# Patient Record
Sex: MALE | NOT HISPANIC OR LATINO | URBAN - METROPOLITAN AREA
[De-identification: names, ages, dates, MRNs, and addresses within clinical notes are randomized per-mention and may not be internally consistent; named-entity substitution may affect disease eponyms.]

---

## 2017-01-01 ENCOUNTER — INPATIENT (INPATIENT)
Facility: HOSPITAL | Age: 47
LOS: 16 days | Discharge: HOME | End: 2017-01-18
Attending: PSYCHIATRY & NEUROLOGY | Admitting: PSYCHIATRY & NEUROLOGY
Payer: MEDICAID

## 2017-01-01 VITALS
OXYGEN SATURATION: 100 % | TEMPERATURE: 98 F | RESPIRATION RATE: 20 BRPM | DIASTOLIC BLOOD PRESSURE: 97 MMHG | SYSTOLIC BLOOD PRESSURE: 142 MMHG | HEART RATE: 107 BPM

## 2017-01-01 DIAGNOSIS — F29 UNSPECIFIED PSYCHOSIS NOT DUE TO A SUBSTANCE OR KNOWN PHYSIOLOGICAL CONDITION: ICD-10-CM

## 2017-01-01 DIAGNOSIS — R69 ILLNESS, UNSPECIFIED: ICD-10-CM

## 2017-01-01 LAB
ALBUMIN SERPL ELPH-MCNC: 4.2 G/DL — SIGNIFICANT CHANGE UP (ref 3.3–5)
ALP SERPL-CCNC: 83 U/L — SIGNIFICANT CHANGE UP (ref 40–120)
ALT FLD-CCNC: 30 U/L — SIGNIFICANT CHANGE UP (ref 4–41)
AMPHET UR-MCNC: POSITIVE — SIGNIFICANT CHANGE UP
APAP SERPL-MCNC: < 15 UG/ML — LOW (ref 15–25)
APPEARANCE UR: CLEAR — SIGNIFICANT CHANGE UP
AST SERPL-CCNC: 42 U/L — HIGH (ref 4–40)
BARBITURATES MEASUREMENT: NEGATIVE — SIGNIFICANT CHANGE UP
BARBITURATES UR SCN-MCNC: NEGATIVE — SIGNIFICANT CHANGE UP
BENZODIAZ SERPL-MCNC: NEGATIVE — SIGNIFICANT CHANGE UP
BENZODIAZ UR-MCNC: NEGATIVE — SIGNIFICANT CHANGE UP
BILIRUB SERPL-MCNC: 0.4 MG/DL — SIGNIFICANT CHANGE UP (ref 0.2–1.2)
BILIRUB UR-MCNC: NEGATIVE — SIGNIFICANT CHANGE UP
BLOOD UR QL VISUAL: NEGATIVE — SIGNIFICANT CHANGE UP
BUN SERPL-MCNC: 31 MG/DL — HIGH (ref 7–23)
CALCIUM SERPL-MCNC: 9 MG/DL — SIGNIFICANT CHANGE UP (ref 8.4–10.5)
CANNABINOIDS UR-MCNC: NEGATIVE — SIGNIFICANT CHANGE UP
CHLORIDE SERPL-SCNC: 102 MMOL/L — SIGNIFICANT CHANGE UP (ref 98–107)
CK SERPL-CCNC: 600 U/L — HIGH (ref 30–200)
CK SERPL-CCNC: 677 U/L — HIGH (ref 30–200)
CO2 SERPL-SCNC: 13 MMOL/L — LOW (ref 22–31)
COCAINE METAB.OTHER UR-MCNC: POSITIVE — SIGNIFICANT CHANGE UP
COLOR SPEC: YELLOW — SIGNIFICANT CHANGE UP
CREAT SERPL-MCNC: 1.16 MG/DL — SIGNIFICANT CHANGE UP (ref 0.5–1.3)
ETHANOL BLD-MCNC: < 10 MG/DL — SIGNIFICANT CHANGE UP
GLUCOSE SERPL-MCNC: 83 MG/DL — SIGNIFICANT CHANGE UP (ref 70–99)
GLUCOSE UR-MCNC: NEGATIVE — SIGNIFICANT CHANGE UP
HCT VFR BLD CALC: 40.9 % — SIGNIFICANT CHANGE UP (ref 39–50)
HGB BLD-MCNC: 13.5 G/DL — SIGNIFICANT CHANGE UP (ref 13–17)
KETONES UR-MCNC: SIGNIFICANT CHANGE UP
LEUKOCYTE ESTERASE UR-ACNC: NEGATIVE — SIGNIFICANT CHANGE UP
MCHC RBC-ENTMCNC: 31.3 PG — SIGNIFICANT CHANGE UP (ref 27–34)
MCHC RBC-ENTMCNC: 33 % — SIGNIFICANT CHANGE UP (ref 32–36)
MCV RBC AUTO: 94.7 FL — SIGNIFICANT CHANGE UP (ref 80–100)
METHADONE UR-MCNC: NEGATIVE — SIGNIFICANT CHANGE UP
MUCOUS THREADS # UR AUTO: SIGNIFICANT CHANGE UP
NITRITE UR-MCNC: NEGATIVE — SIGNIFICANT CHANGE UP
NON-SQ EPI CELLS # UR AUTO: <1 — SIGNIFICANT CHANGE UP
OPIATES UR-MCNC: NEGATIVE — SIGNIFICANT CHANGE UP
OXYCODONE UR-MCNC: NEGATIVE — SIGNIFICANT CHANGE UP
PCP UR-MCNC: NEGATIVE — SIGNIFICANT CHANGE UP
PH UR: 6 — SIGNIFICANT CHANGE UP (ref 4.6–8)
PLATELET # BLD AUTO: 248 K/UL — SIGNIFICANT CHANGE UP (ref 150–400)
PMV BLD: 12.4 FL — SIGNIFICANT CHANGE UP (ref 7–13)
POTASSIUM SERPL-MCNC: 3.8 MMOL/L — SIGNIFICANT CHANGE UP (ref 3.5–5.3)
POTASSIUM SERPL-SCNC: 3.8 MMOL/L — SIGNIFICANT CHANGE UP (ref 3.5–5.3)
PROT SERPL-MCNC: 7.2 G/DL — SIGNIFICANT CHANGE UP (ref 6–8.3)
PROT UR-MCNC: 30 — SIGNIFICANT CHANGE UP
RBC # BLD: 4.32 M/UL — SIGNIFICANT CHANGE UP (ref 4.2–5.8)
RBC # FLD: 13 % — SIGNIFICANT CHANGE UP (ref 10.3–14.5)
RBC CASTS # UR COMP ASSIST: SIGNIFICANT CHANGE UP (ref 0–?)
SALICYLATES SERPL-MCNC: 16.8 MG/DL — SIGNIFICANT CHANGE UP (ref 15–30)
SODIUM SERPL-SCNC: 140 MMOL/L — SIGNIFICANT CHANGE UP (ref 135–145)
SP GR SPEC: > 1.04 — HIGH (ref 1–1.03)
TSH SERPL-MCNC: 0.99 UIU/ML — SIGNIFICANT CHANGE UP (ref 0.27–4.2)
UROBILINOGEN FLD QL: NORMAL E.U. — SIGNIFICANT CHANGE UP (ref 0.1–0.2)
WBC # BLD: 15.75 K/UL — HIGH (ref 3.8–10.5)
WBC # FLD AUTO: 15.75 K/UL — HIGH (ref 3.8–10.5)
WBC UR QL: SIGNIFICANT CHANGE UP (ref 0–?)

## 2017-01-01 PROCEDURE — 99285 EMERGENCY DEPT VISIT HI MDM: CPT | Mod: GC

## 2017-01-01 RX ORDER — OLANZAPINE 15 MG/1
5 TABLET, FILM COATED ORAL AT BEDTIME
Qty: 0 | Refills: 0 | Status: DISCONTINUED | OUTPATIENT
Start: 2017-01-01 | End: 2017-01-04

## 2017-01-01 RX ORDER — HALOPERIDOL DECANOATE 100 MG/ML
5 INJECTION INTRAMUSCULAR EVERY 6 HOURS
Qty: 0 | Refills: 0 | Status: DISCONTINUED | OUTPATIENT
Start: 2017-01-01 | End: 2017-01-18

## 2017-01-01 RX ORDER — NICOTINE POLACRILEX 2 MG
1 GUM BUCCAL DAILY
Qty: 0 | Refills: 0 | Status: DISCONTINUED | OUTPATIENT
Start: 2017-01-01 | End: 2017-01-18

## 2017-01-01 RX ORDER — NICOTINE POLACRILEX 2 MG
2 GUM BUCCAL
Qty: 0 | Refills: 0 | Status: DISCONTINUED | OUTPATIENT
Start: 2017-01-01 | End: 2017-01-18

## 2017-01-01 RX ORDER — OLANZAPINE 15 MG/1
5 TABLET, FILM COATED ORAL AT BEDTIME
Qty: 0 | Refills: 0 | Status: DISCONTINUED | OUTPATIENT
Start: 2017-01-01 | End: 2017-01-01

## 2017-01-01 RX ORDER — DIPHENHYDRAMINE HCL 50 MG
50 CAPSULE ORAL EVERY 6 HOURS
Qty: 0 | Refills: 0 | Status: DISCONTINUED | OUTPATIENT
Start: 2017-01-01 | End: 2017-01-18

## 2017-01-01 RX ORDER — SODIUM CHLORIDE 9 MG/ML
2000 INJECTION INTRAMUSCULAR; INTRAVENOUS; SUBCUTANEOUS ONCE
Qty: 0 | Refills: 0 | Status: COMPLETED | OUTPATIENT
Start: 2017-01-01 | End: 2017-01-01

## 2017-01-01 RX ORDER — ACETAMINOPHEN 500 MG
650 TABLET ORAL ONCE
Qty: 0 | Refills: 0 | Status: COMPLETED | OUTPATIENT
Start: 2017-01-01 | End: 2017-01-01

## 2017-01-01 RX ADMIN — Medication 650 MILLIGRAM(S): at 09:35

## 2017-01-01 RX ADMIN — Medication 1 MILLIGRAM(S): at 19:30

## 2017-01-01 RX ADMIN — SODIUM CHLORIDE 1000 MILLILITER(S): 9 INJECTION INTRAMUSCULAR; INTRAVENOUS; SUBCUTANEOUS at 17:43

## 2017-01-01 RX ADMIN — Medication 2 MILLIGRAM(S): at 13:10

## 2017-01-01 NOTE — ED PROVIDER NOTE - OBJECTIVE STATEMENT
46M history of ADHD on Adderall and Depression presents with tongue ulcerations and agitation.  Patient states tongue ulcers have been present for one year.   Patient remains agitated and is unable to provide a complete history, Multiple attempts to reach family for collateral information at [phone], [phone], or [phone] have been unsuccessful. 46M history of ADHD on Adderall, Polysubstance Abuse, Asperger's and Depression presents with tongue ulcerations and agitation.  Patient states tongue ulcers have been present for one year.   Patient remains agitated and is unable to provide a complete history, Multiple attempts to reach family for collateral information at [phone], [phone], or [phone] have been unsuccessful.

## 2017-01-01 NOTE — ED BEHAVIORAL HEALTH ASSESSMENT NOTE - CURRENT MEDICATION
Wellbutrin  mg qAm, Adderall 30 mg bid, Adderall XP 10 mg in the afternoon, Lexapro 30 mg daily, Dextroamphetamine 10 mg qhs

## 2017-01-01 NOTE — ED BEHAVIORAL HEALTH ASSESSMENT NOTE - DESCRIPTION (FIRST USE, LAST USE, QUANTITY, FREQUENCY, DURATION)
cigarettes > 1 pack a day since age 17 since early teens Patient denies use for the past 18 months, Utox positive for cocaine

## 2017-01-01 NOTE — ED PROVIDER NOTE - CHIEF COMPLAINT
The patient is a 46y Male complaining of tongue pain The patient is a 46y Male complaining of tongue pain, agitation.

## 2017-01-01 NOTE — ED BEHAVIORAL HEALTH ASSESSMENT NOTE - DETAILS
Patient destroyed her mothers belongings and their furniiture cousin committed suicide painful lesions on the tongue cough excoriations Dr. Guzman JEWELL Pt's family aware of the plan and agrees Patient destroyed her mothers belongings and their furniture, several people have orders of protection against him uncle committed suicide

## 2017-01-01 NOTE — ED BEHAVIORAL HEALTH ASSESSMENT NOTE - OTHER
sister, chart neighbors restless, disorganized dyskinesia interviewed in bed whispering grandiosity denies but his behavior is suggestive of hallucinations

## 2017-01-01 NOTE — ED BEHAVIORAL HEALTH ASSESSMENT NOTE - CASE SUMMARY
46M with cocaine dependence depression and anxiety, followed in [facility] addiction clinic, presents with EMS activated by a neighbor when patient was wandering the hallways naked and acting strangely. Patient seen at [facility] 4 days ago, continued on his high dose of adderall, and it was noted he was demanding medication changes. It was documented his tox only showed stimulant. Today tox + cocaine and stimulant. Family states he is threatening them and destroyed property. Presentation thought to be substance induced, but after ativan, hydration, and several hours of sleep in the ED, patient continues to be bizarre, exposing self, responding to internal stimuli with thought disorder. He cannot care for his basic needs at this time and is a risk of harm to both himself and others. Admit to low 4, 939. no CO needed in locked supervised setting as pt is not suicidal or violent. I agree with plan as above. EMS transport. Hold adderall.

## 2017-01-01 NOTE — ED BEHAVIORAL HEALTH ASSESSMENT NOTE - AXIS IV
Problems with primary support/Occupational problems/Problems with interaction with legal system/Problem related to social environment/Economic problems

## 2017-01-01 NOTE — ED BEHAVIORAL HEALTH ASSESSMENT NOTE - HPI (INCLUDE ILLNESS QUALITY, SEVERITY, DURATION, TIMING, CONTEXT, MODIFYING FACTORS, ASSOCIATED SIGNS AND SYMPTOMS)
47 y/o  male, single, domiciled with mother, unemployed, non caregiver, hx of alcohol, cocaine and marijuana abuse, hx of arrests for drug trafficking, resulting in court mandated treatment, recent hx of order of protection requested by his father and ex-girlfriend, one prior inpatient admission at age 13 for marijuana and "emotional disturbances", currently in outpatient treatment at [facility] for cocaine addiction, no hx of suicidality, hx of verbal aggression but not physical brought in by EMS activated by the Pt's neighbors when the Patient was noted pacing the hallways of the apartment building naked.     On arrival the Patient was restless and agitated. A single dose of  Ativan 2 mg PO was given and the Patient fell asleep.      Collateral information gathered from the Pt's sister Raya and mother Queenie clarifies that the Patient was at his usual state of mental health until 11/28/2016, when after his  birthday his behavior started to change. The Patient lost his job and broke up with his girlfriend, who filed a request for order of protection against him and his conflicts with his father got to the point that his father refused to have any contact with the Patient  and also filed a request for order of protection. The Patient was upset about these developments and continues to send "nasty, threatening emails" to his family members. The Patient never became physical in the past. Per the family members the Pt's behavior had dramatically changed during the past 48h. He became restless, agitated, had bizarre behaviors, appeared to be hallucinating, refused to wear clothing, turned  "all the furniture upside-down" and threw his and his mothers belongings on the floor making it impossible for his mother to return to the apartment. The Patient also tore the mother's clothes up and destroyed some of her other belongings. The family members confirmed that this is a marked change com[ared to the Pt's baseline and they never noted to have symptoms to this extent. 45 y/o  male, single, domiciled with mother, unemployed, non caregiver, hx of alcohol, cocaine and marijuana abuse, hx of arrests for drug trafficking, resulting in court mandated treatment, recent hx of order of protection requested by his father and ex-girlfriend, one prior inpatient admission at age 13 for marijuana and "emotional disturbances", currently in outpatient treatment at [facility] for cocaine addiction, no hx of suicidality, hx of verbal aggression but not physical brought in by EMS activated by the Pt's neighbors when the Patient was noted pacing the hallways of the apartment building naked.     On arrival the Patient was restless and agitated. A single dose of  Ativan 2 mg PO was given and the Patient fell asleep. When the Patient woke up he wasn't able to tolerate a full diagnostic interview. The Patient is in hospital garb, but wears the gown as a cape and his pants are below his genitals. The Patient is restless, unable to stay still, and is whispering when answering questions.  The Patient states that he is surprised that he is in a hospital and he has no recollection of how he ended up here. When the writer tell the Patient that he severely damaged his and his mother's property the Patient insists that it had happened days before he was brought in. When asked about his motivation for braking things he sticks his tongue out to show some lesions on it. When the writer asks for further explanation the Patient tells, that the "ulcers" are painful and his "mother wasn't paying enough attention to try to help" an this was his "way of letting her know that he is in pain".  The Patient denies hearing voices but seems to be responding internal stimuli. The Patient frequently goes on tangents, talking about recovery, writing books and making movies. When confronted with the positive Utox the Patient states that it is from the Orajel he was using for the pain. Regarding his skin lesions the Patient is not able to give an explanation.     Collateral information gathered from the Pt's sister Raya and mother Queenie clarifies that the Patient was at his usual state of mental health until 11/28/2016, when after his  birthday his behavior started to change. The Patient lost his job and broke up with his girlfriend, who filed a request for order of protection against him and his conflicts with his father got to the point that his father refused to have any contact with the Patient  and also filed a request for order of protection, as did the ex-girlfriend's boss and the super of the apartment building. The Patient was upset about these developments and continues to send "nasty, threatening emails" to his family members. The Patient never became physical in the past. Per the family members the Pt's behavior had dramatically changed during the past 48h. He became restless, agitated, had bizarre behaviors, appeared to be hallucinating, refused to wear clothing, turned  "all the furniture upside-down" and threw his and his mothers belongings on the floor making it impossible for his mother to return to the apartment. The Patient also tore the mother's clothes up and destroyed some of her other belongings. The family members confirmed that this is a marked change compared to the Pt's baseline and they never noted to have symptoms to this extent. The Pt's mother reports being afraid of her son for the first time, and is concerned that she will be at risk if he returns home.

## 2017-01-01 NOTE — ED PROVIDER NOTE - MEDICAL DECISION MAKING DETAILS
46M history of polysubstance abuse, depression and adhd presents with agitation and oral ulcers,   Plan r/o acute intoxication utox/serum tox, cmp/cbx 46M history of polysubstance abuse, depression, asperger's and adhd presents with agitation and oral ulcers,   Plan r/o acute intoxication utox/serum tox, cmp/cbx. Psych evaluation

## 2017-01-01 NOTE — ED BEHAVIORAL HEALTH ASSESSMENT NOTE - SUICIDE RISK FACTORS
Family history of suicide/Highly impulsive behavior/Substance abuse/dependence/Agitation/severe anxiety

## 2017-01-01 NOTE — ED ADULT NURSE REASSESSMENT NOTE - NS ED NURSE REASSESS COMMENT FT1
Patient is restless, speech is rapid and pressured, garbled, difficult to understand. Noted to have multiple sores/wounds on tongue, patient states "it was an infection." Denies AVH, SHIIP. Patient is disheveled and unkempt. PO medication given as per MD order. NAD. Safety and comfort measures maintained. Receptive to staff support/reassurance.

## 2017-01-01 NOTE — ED ADULT NURSE NOTE - CHIEF COMPLAINT QUOTE
Pt. brought to [facility] ED via EMS. Pt's neighbors called 911 because he was pacing back and forth in hallway. When EMS got to the scene, Pt. was naked in the hallway. Pt. presents to the ED with whispers, cannot understand patient. Pt. is noted with sores on tongue.  Pointing at things and thrashing in stretcher. Pt. has history of cocaine and cannibis abuse, as well as ADD  and depression.  Pt. will need 1:1 observation. According to EMS, pt.'s apartment is completely a mess.  attending at triage to evaluate. Will be seen in adult ED.

## 2017-01-01 NOTE — ED BEHAVIORAL HEALTH ASSESSMENT NOTE - SUMMARY
45 y/o  male, single, domiciled with mother, unemployed, non caregiver, hx of alcohol, cocaine and marijuana abuse, hx of arrests for drug trafficking, resulting in court mandated treatment, recent hx of order of protection requested by his father and ex-girlfriend, one prior inpatient admission at age 13 for marijuana and "emotional disturbances", [facility], no hx of suicidality, hx of verbal aggression but not physical brought in by EMS activated by the Pt's neighbors when the Patient was noted pacing the hallways of the apartment building naked. On examination the Patient was in distress due to involuntary movements and severe anxiety. The Pt's disorganized behavior and speech, disordered thought process and behavior suggestive of  hallucinations is consistent with an acute psychotic episode of unclear etiology.   The Patient currently is not able to provide for his basic needs and his family has safety concerns for him returning home thus the Patient meets criteria for inpatient level of care. As the Patient is not actively suicidal or disorganized to the point when he is no longer safe in a controlled environment there is no indication for CO 1:1. 45 y/o  male, single, domiciled with mother, unemployed, non caregiver, hx of alcohol, cocaine and marijuana abuse, hx of arrests for drug trafficking, resulting in court mandated treatment, recent hx of order of protection requested by his father and ex-girlfriend, one prior inpatient admission at age 13 for marijuana and "emotional disturbances", currently in outpatient treatment at [facility] for cocaine addiction, no hx of suicidality, hx of verbal aggression but not physical brought in by EMS activated by the Pt's neighbors when the Patient was noted pacing the hallways of the apartment building naked. On examination the Patient was in distress due to involuntary movements and severe anxiety. The Pt's disorganized behavior and speech, disordered thought process and behavior suggestive of  hallucinations is consistent with an acute psychotic episode of unclear etiology.   The Patient currently is not able to provide for his basic needs and his family has safety concerns for him returning home thus the Patient meets criteria for inpatient level of care. As the Patient is not actively suicidal or disorganized to the point when he is no longer safe in a controlled environment there is no indication for CO 1:1. The Patient requires anxiolytic and antipsychotic medication for stabilization. As the Patient is already exhibit signs of potential extrapyramidal signs, possibly due to stimulant use the medication of choice was olanzapine rather then risperidone.

## 2017-01-01 NOTE — ED PROVIDER NOTE - PROGRESS NOTE DETAILS
Discussed patient with father (who patient is estranged with and who has an order of protection against) as well as his sister Raya ([phone]) who is in contact with the patient, since 11/28 the patient has been behaving erratically. Patient has been noted to have hallucinations, has been aggressive towards los: pt has been sleeping after ativan given and has not taken fluids in. low SG on ua. will place IV and adminsiter 2 liters NS. disposition as per psychiatry. both cocaine and amphetamines in urine.

## 2017-01-01 NOTE — ED ADULT NURSE NOTE - OBJECTIVE STATEMENT
Patient received into room 24 AA&Ox3 (agitated with manic-like behavior) after being found in this state at his apartment complex. VSS on RA. Patient denies chest pain, N/V, SOB, fever, chills, illicit drugs use - labs pending. Patient with small abrasions/lacerations to entire body and patient continues to be in constant motion/thrashing in stretcher - will continue to monitor.

## 2017-01-01 NOTE — ED BEHAVIORAL HEALTH ASSESSMENT NOTE - DESCRIPTION
sleeping, skin covered in excoriation marks. no outstanding medical problems single, unemployed, lives with mother sleeping, skin covered in excoriation marks. When awake restless, unable to remain still.

## 2017-01-01 NOTE — ED ADULT TRIAGE NOTE - CHIEF COMPLAINT QUOTE
Pt. brought to [facility] ED via EMS. Pt's neighbors called 911 because he was pacing back and forth in hallway. When EMS got to the scene, Pt. was naked in the hallway. Pt. presents to the ED with whispers, cannot understand patient. Pt. is noted with sores on tongue.  Pointing at things and thrashing in stretcher. Pt. has history of cocaine and cannibis abuse, as well as ADD  and depression.  Pt. will need 1:1 observation. According to EMS, pt.'s apartment is completely a mess.  attending at triage to evaluate. Will be seen in adult ED.

## 2017-01-01 NOTE — ED PROVIDER NOTE - ATTENDING CONTRIBUTION TO CARE
los: limited hx from pt.   unable to reach relatives by phone.  pt states he has hx asbergers and adhd; on adderal. had verbal altercation with mother and sent to ED.  pts only other complaint is painful lesions to tongue (year duration?).   at times becomes agitated, and talks in a rapid and stressed tone.   exam: remarkable for difficulty sitting still. there are numerous ulcerated lesions to tongue  Labs sent. attempting to reach family. psychiatry aware.

## 2017-01-02 LAB
CHOLEST SERPL-MCNC: 120 MG/DL — SIGNIFICANT CHANGE UP (ref 120–199)
CK SERPL-CCNC: 427 U/L — HIGH (ref 30–200)
HDLC SERPL-MCNC: 52 MG/DL — SIGNIFICANT CHANGE UP (ref 35–55)
LIPID PNL WITH DIRECT LDL SERPL: 48 MG/DL — SIGNIFICANT CHANGE UP
SPECIMEN SOURCE: SIGNIFICANT CHANGE UP
TRIGL SERPL-MCNC: 77 MG/DL — SIGNIFICANT CHANGE UP (ref 10–149)

## 2017-01-02 PROCEDURE — 99222 1ST HOSP IP/OBS MODERATE 55: CPT

## 2017-01-02 RX ADMIN — Medication 1 PATCH: at 08:33

## 2017-01-02 RX ADMIN — Medication 1 MILLIGRAM(S): at 08:33

## 2017-01-02 RX ADMIN — OLANZAPINE 5 MILLIGRAM(S): 15 TABLET, FILM COATED ORAL at 21:29

## 2017-01-03 LAB
BACTERIA UR CULT: SIGNIFICANT CHANGE UP
CK SERPL-CCNC: 108 U/L — SIGNIFICANT CHANGE UP (ref 30–200)

## 2017-01-03 PROCEDURE — 99232 SBSQ HOSP IP/OBS MODERATE 35: CPT

## 2017-01-04 PROCEDURE — 99232 SBSQ HOSP IP/OBS MODERATE 35: CPT

## 2017-01-04 PROCEDURE — 99223 1ST HOSP IP/OBS HIGH 75: CPT

## 2017-01-04 RX ORDER — BUPROPION HYDROCHLORIDE 150 MG/1
100 TABLET, EXTENDED RELEASE ORAL DAILY
Qty: 0 | Refills: 0 | Status: COMPLETED | OUTPATIENT
Start: 2017-01-04 | End: 2017-01-04

## 2017-01-04 RX ORDER — ESCITALOPRAM OXALATE 10 MG/1
5 TABLET, FILM COATED ORAL AT BEDTIME
Qty: 0 | Refills: 0 | Status: DISCONTINUED | OUTPATIENT
Start: 2017-01-04 | End: 2017-01-09

## 2017-01-04 RX ADMIN — Medication 30 MILLILITER(S): at 14:30

## 2017-01-04 RX ADMIN — BUPROPION HYDROCHLORIDE 100 MILLIGRAM(S): 150 TABLET, EXTENDED RELEASE ORAL at 14:30

## 2017-01-05 PROCEDURE — 99232 SBSQ HOSP IP/OBS MODERATE 35: CPT

## 2017-01-05 RX ORDER — BUPROPION HYDROCHLORIDE 150 MG/1
100 TABLET, EXTENDED RELEASE ORAL
Qty: 0 | Refills: 0 | Status: DISCONTINUED | OUTPATIENT
Start: 2017-01-05 | End: 2017-01-06

## 2017-01-05 RX ADMIN — Medication 1 PATCH: at 09:56

## 2017-01-05 RX ADMIN — BUPROPION HYDROCHLORIDE 100 MILLIGRAM(S): 150 TABLET, EXTENDED RELEASE ORAL at 20:22

## 2017-01-05 RX ADMIN — ESCITALOPRAM OXALATE 5 MILLIGRAM(S): 10 TABLET, FILM COATED ORAL at 20:22

## 2017-01-05 RX ADMIN — BUPROPION HYDROCHLORIDE 100 MILLIGRAM(S): 150 TABLET, EXTENDED RELEASE ORAL at 11:56

## 2017-01-05 RX ADMIN — Medication 30 MILLILITER(S): at 07:25

## 2017-01-06 LAB
HIV1 AG SER QL: SIGNIFICANT CHANGE UP
HIV1+2 AB SPEC QL: SIGNIFICANT CHANGE UP

## 2017-01-06 PROCEDURE — 99232 SBSQ HOSP IP/OBS MODERATE 35: CPT

## 2017-01-06 RX ORDER — BUPROPION HYDROCHLORIDE 150 MG/1
100 TABLET, EXTENDED RELEASE ORAL AT BEDTIME
Qty: 0 | Refills: 0 | Status: COMPLETED | OUTPATIENT
Start: 2017-01-06 | End: 2017-01-06

## 2017-01-06 RX ORDER — BUPROPION HYDROCHLORIDE 150 MG/1
100 TABLET, EXTENDED RELEASE ORAL
Qty: 0 | Refills: 0 | Status: DISCONTINUED | OUTPATIENT
Start: 2017-01-07 | End: 2017-01-13

## 2017-01-06 RX ADMIN — ESCITALOPRAM OXALATE 5 MILLIGRAM(S): 10 TABLET, FILM COATED ORAL at 20:30

## 2017-01-06 RX ADMIN — Medication 1 PATCH: at 08:33

## 2017-01-06 RX ADMIN — BUPROPION HYDROCHLORIDE 100 MILLIGRAM(S): 150 TABLET, EXTENDED RELEASE ORAL at 20:30

## 2017-01-06 RX ADMIN — BUPROPION HYDROCHLORIDE 100 MILLIGRAM(S): 150 TABLET, EXTENDED RELEASE ORAL at 08:55

## 2017-01-07 PROCEDURE — 99232 SBSQ HOSP IP/OBS MODERATE 35: CPT

## 2017-01-07 RX ADMIN — Medication 1 PATCH: at 08:30

## 2017-01-07 RX ADMIN — ESCITALOPRAM OXALATE 5 MILLIGRAM(S): 10 TABLET, FILM COATED ORAL at 20:46

## 2017-01-07 RX ADMIN — BUPROPION HYDROCHLORIDE 100 MILLIGRAM(S): 150 TABLET, EXTENDED RELEASE ORAL at 17:16

## 2017-01-07 RX ADMIN — BUPROPION HYDROCHLORIDE 100 MILLIGRAM(S): 150 TABLET, EXTENDED RELEASE ORAL at 08:27

## 2017-01-08 PROCEDURE — 99232 SBSQ HOSP IP/OBS MODERATE 35: CPT

## 2017-01-08 RX ADMIN — Medication 1 PATCH: at 08:06

## 2017-01-08 RX ADMIN — ESCITALOPRAM OXALATE 5 MILLIGRAM(S): 10 TABLET, FILM COATED ORAL at 20:27

## 2017-01-08 RX ADMIN — Medication 30 MILLILITER(S): at 00:00

## 2017-01-08 RX ADMIN — BUPROPION HYDROCHLORIDE 100 MILLIGRAM(S): 150 TABLET, EXTENDED RELEASE ORAL at 08:06

## 2017-01-08 RX ADMIN — Medication 30 MILLILITER(S): at 05:31

## 2017-01-08 RX ADMIN — BUPROPION HYDROCHLORIDE 100 MILLIGRAM(S): 150 TABLET, EXTENDED RELEASE ORAL at 16:47

## 2017-01-09 PROCEDURE — 99232 SBSQ HOSP IP/OBS MODERATE 35: CPT

## 2017-01-09 RX ORDER — LITHIUM CARBONATE 300 MG/1
300 TABLET, EXTENDED RELEASE ORAL
Qty: 0 | Refills: 0 | Status: DISCONTINUED | OUTPATIENT
Start: 2017-01-10 | End: 2017-01-11

## 2017-01-09 RX ORDER — LITHIUM CARBONATE 300 MG/1
300 TABLET, EXTENDED RELEASE ORAL ONCE
Qty: 0 | Refills: 0 | Status: COMPLETED | OUTPATIENT
Start: 2017-01-09 | End: 2017-01-09

## 2017-01-09 RX ADMIN — Medication 1 PATCH: at 09:00

## 2017-01-09 RX ADMIN — Medication 1 PATCH: at 09:22

## 2017-01-09 RX ADMIN — LITHIUM CARBONATE 300 MILLIGRAM(S): 300 TABLET, EXTENDED RELEASE ORAL at 17:14

## 2017-01-09 RX ADMIN — BUPROPION HYDROCHLORIDE 100 MILLIGRAM(S): 150 TABLET, EXTENDED RELEASE ORAL at 17:14

## 2017-01-09 RX ADMIN — BUPROPION HYDROCHLORIDE 100 MILLIGRAM(S): 150 TABLET, EXTENDED RELEASE ORAL at 08:39

## 2017-01-09 RX ADMIN — Medication 30 MILLILITER(S): at 21:04

## 2017-01-09 RX ADMIN — Medication 30 MILLILITER(S): at 01:35

## 2017-01-10 PROCEDURE — 99232 SBSQ HOSP IP/OBS MODERATE 35: CPT

## 2017-01-10 RX ORDER — DIPHENHYDRAMINE HCL 50 MG
50 CAPSULE ORAL EVERY 4 HOURS
Qty: 0 | Refills: 0 | Status: DISCONTINUED | OUTPATIENT
Start: 2017-01-10 | End: 2017-01-17

## 2017-01-10 RX ORDER — HALOPERIDOL DECANOATE 100 MG/ML
5 INJECTION INTRAMUSCULAR ONCE
Qty: 0 | Refills: 0 | Status: COMPLETED | OUTPATIENT
Start: 2017-01-10 | End: 2017-01-10

## 2017-01-10 RX ADMIN — Medication 2 MILLIGRAM(S): at 02:39

## 2017-01-10 RX ADMIN — Medication 1 PATCH: at 09:53

## 2017-01-10 RX ADMIN — BUPROPION HYDROCHLORIDE 100 MILLIGRAM(S): 150 TABLET, EXTENDED RELEASE ORAL at 08:35

## 2017-01-10 RX ADMIN — BUPROPION HYDROCHLORIDE 100 MILLIGRAM(S): 150 TABLET, EXTENDED RELEASE ORAL at 16:45

## 2017-01-10 RX ADMIN — LITHIUM CARBONATE 300 MILLIGRAM(S): 300 TABLET, EXTENDED RELEASE ORAL at 08:35

## 2017-01-10 RX ADMIN — Medication 30 MILLILITER(S): at 02:39

## 2017-01-10 RX ADMIN — HALOPERIDOL DECANOATE 5 MILLIGRAM(S): 100 INJECTION INTRAMUSCULAR at 02:39

## 2017-01-10 RX ADMIN — Medication 50 MILLIGRAM(S): at 02:39

## 2017-01-10 RX ADMIN — Medication 50 MILLIGRAM(S): at 17:08

## 2017-01-10 RX ADMIN — LITHIUM CARBONATE 300 MILLIGRAM(S): 300 TABLET, EXTENDED RELEASE ORAL at 20:48

## 2017-01-11 PROCEDURE — 99232 SBSQ HOSP IP/OBS MODERATE 35: CPT

## 2017-01-11 RX ORDER — DIPHENHYDRAMINE HCL 50 MG
50 CAPSULE ORAL EVERY 6 HOURS
Qty: 0 | Refills: 0 | Status: DISCONTINUED | OUTPATIENT
Start: 2017-01-11 | End: 2017-01-17

## 2017-01-11 RX ORDER — LITHIUM CARBONATE 300 MG/1
150 TABLET, EXTENDED RELEASE ORAL ONCE
Qty: 0 | Refills: 0 | Status: COMPLETED | OUTPATIENT
Start: 2017-01-11 | End: 2017-01-11

## 2017-01-11 RX ORDER — HALOPERIDOL DECANOATE 100 MG/ML
5 INJECTION INTRAMUSCULAR ONCE
Qty: 0 | Refills: 0 | Status: COMPLETED | OUTPATIENT
Start: 2017-01-11 | End: 2017-01-11

## 2017-01-11 RX ORDER — HALOPERIDOL DECANOATE 100 MG/ML
5 INJECTION INTRAMUSCULAR EVERY 6 HOURS
Qty: 0 | Refills: 0 | Status: DISCONTINUED | OUTPATIENT
Start: 2017-01-11 | End: 2017-01-12

## 2017-01-11 RX ORDER — TRAZODONE HCL 50 MG
100 TABLET ORAL ONCE
Qty: 0 | Refills: 0 | Status: COMPLETED | OUTPATIENT
Start: 2017-01-11 | End: 2017-01-11

## 2017-01-11 RX ORDER — LITHIUM CARBONATE 300 MG/1
450 TABLET, EXTENDED RELEASE ORAL
Qty: 0 | Refills: 0 | Status: DISCONTINUED | OUTPATIENT
Start: 2017-01-11 | End: 2017-01-13

## 2017-01-11 RX ADMIN — LITHIUM CARBONATE 150 MILLIGRAM(S): 300 TABLET, EXTENDED RELEASE ORAL at 11:26

## 2017-01-11 RX ADMIN — Medication 50 MILLIGRAM(S): at 23:30

## 2017-01-11 RX ADMIN — Medication 1 PATCH: at 12:58

## 2017-01-11 RX ADMIN — HALOPERIDOL DECANOATE 5 MILLIGRAM(S): 100 INJECTION INTRAMUSCULAR at 23:30

## 2017-01-11 RX ADMIN — Medication 100 MILLIGRAM(S): at 23:30

## 2017-01-11 RX ADMIN — Medication 50 MILLIGRAM(S): at 08:10

## 2017-01-11 RX ADMIN — LITHIUM CARBONATE 300 MILLIGRAM(S): 300 TABLET, EXTENDED RELEASE ORAL at 08:10

## 2017-01-11 RX ADMIN — Medication 50 MILLIGRAM(S): at 12:56

## 2017-01-11 RX ADMIN — LITHIUM CARBONATE 450 MILLIGRAM(S): 300 TABLET, EXTENDED RELEASE ORAL at 20:48

## 2017-01-11 RX ADMIN — BUPROPION HYDROCHLORIDE 100 MILLIGRAM(S): 150 TABLET, EXTENDED RELEASE ORAL at 08:10

## 2017-01-11 RX ADMIN — BUPROPION HYDROCHLORIDE 100 MILLIGRAM(S): 150 TABLET, EXTENDED RELEASE ORAL at 16:28

## 2017-01-12 PROCEDURE — 99232 SBSQ HOSP IP/OBS MODERATE 35: CPT

## 2017-01-12 RX ORDER — HALOPERIDOL DECANOATE 100 MG/ML
5 INJECTION INTRAMUSCULAR ONCE
Qty: 0 | Refills: 0 | Status: DISCONTINUED | OUTPATIENT
Start: 2017-01-12 | End: 2017-01-12

## 2017-01-12 RX ORDER — TRAZODONE HCL 50 MG
50 TABLET ORAL AT BEDTIME
Qty: 0 | Refills: 0 | Status: DISCONTINUED | OUTPATIENT
Start: 2017-01-12 | End: 2017-01-18

## 2017-01-12 RX ADMIN — Medication 1 PATCH: at 08:34

## 2017-01-12 RX ADMIN — LITHIUM CARBONATE 450 MILLIGRAM(S): 300 TABLET, EXTENDED RELEASE ORAL at 20:47

## 2017-01-12 RX ADMIN — BUPROPION HYDROCHLORIDE 100 MILLIGRAM(S): 150 TABLET, EXTENDED RELEASE ORAL at 08:34

## 2017-01-12 RX ADMIN — LITHIUM CARBONATE 450 MILLIGRAM(S): 300 TABLET, EXTENDED RELEASE ORAL at 08:34

## 2017-01-13 LAB
ALBUMIN SERPL ELPH-MCNC: 3.6 G/DL — SIGNIFICANT CHANGE UP (ref 3.3–5)
ALP SERPL-CCNC: 81 U/L — SIGNIFICANT CHANGE UP (ref 40–120)
ALT FLD-CCNC: 23 U/L — SIGNIFICANT CHANGE UP (ref 4–41)
AST SERPL-CCNC: 17 U/L — SIGNIFICANT CHANGE UP (ref 4–40)
BASOPHILS # BLD AUTO: 0.05 K/UL — SIGNIFICANT CHANGE UP (ref 0–0.2)
BASOPHILS NFR BLD AUTO: 0.5 % — SIGNIFICANT CHANGE UP (ref 0–2)
BILIRUB SERPL-MCNC: 0.2 MG/DL — SIGNIFICANT CHANGE UP (ref 0.2–1.2)
BUN SERPL-MCNC: 23 MG/DL — SIGNIFICANT CHANGE UP (ref 7–23)
CALCIUM SERPL-MCNC: 9 MG/DL — SIGNIFICANT CHANGE UP (ref 8.4–10.5)
CHLORIDE SERPL-SCNC: 100 MMOL/L — SIGNIFICANT CHANGE UP (ref 98–107)
CO2 SERPL-SCNC: 25 MMOL/L — SIGNIFICANT CHANGE UP (ref 22–31)
CREAT SERPL-MCNC: 0.8 MG/DL — SIGNIFICANT CHANGE UP (ref 0.5–1.3)
EOSINOPHIL # BLD AUTO: 0.23 K/UL — SIGNIFICANT CHANGE UP (ref 0–0.5)
EOSINOPHIL NFR BLD AUTO: 2.1 % — SIGNIFICANT CHANGE UP (ref 0–6)
GLUCOSE SERPL-MCNC: 91 MG/DL — SIGNIFICANT CHANGE UP (ref 70–99)
HCT VFR BLD CALC: 36.9 % — LOW (ref 39–50)
HGB BLD-MCNC: 11.7 G/DL — LOW (ref 13–17)
IMM GRANULOCYTES NFR BLD AUTO: 0.8 % — SIGNIFICANT CHANGE UP (ref 0–1.5)
LITHIUM SERPL-MCNC: 0.42 MMOL/L — LOW (ref 0.6–1.2)
LYMPHOCYTES # BLD AUTO: 1.4 K/UL — SIGNIFICANT CHANGE UP (ref 1–3.3)
LYMPHOCYTES # BLD AUTO: 12.9 % — LOW (ref 13–44)
MCHC RBC-ENTMCNC: 30.6 PG — SIGNIFICANT CHANGE UP (ref 27–34)
MCHC RBC-ENTMCNC: 31.7 % — LOW (ref 32–36)
MCV RBC AUTO: 96.6 FL — SIGNIFICANT CHANGE UP (ref 80–100)
MONOCYTES # BLD AUTO: 0.72 K/UL — SIGNIFICANT CHANGE UP (ref 0–0.9)
MONOCYTES NFR BLD AUTO: 6.6 % — SIGNIFICANT CHANGE UP (ref 2–14)
NEUTROPHILS # BLD AUTO: 8.36 K/UL — HIGH (ref 1.8–7.4)
NEUTROPHILS NFR BLD AUTO: 77.1 % — HIGH (ref 43–77)
PLATELET # BLD AUTO: 358 K/UL — SIGNIFICANT CHANGE UP (ref 150–400)
PMV BLD: 12 FL — SIGNIFICANT CHANGE UP (ref 7–13)
POTASSIUM SERPL-MCNC: 5.3 MMOL/L — SIGNIFICANT CHANGE UP (ref 3.5–5.3)
POTASSIUM SERPL-SCNC: 5.3 MMOL/L — SIGNIFICANT CHANGE UP (ref 3.5–5.3)
PROT SERPL-MCNC: 6.2 G/DL — SIGNIFICANT CHANGE UP (ref 6–8.3)
RBC # BLD: 3.82 M/UL — LOW (ref 4.2–5.8)
RBC # FLD: 13.1 % — SIGNIFICANT CHANGE UP (ref 10.3–14.5)
SODIUM SERPL-SCNC: 137 MMOL/L — SIGNIFICANT CHANGE UP (ref 135–145)
WBC # BLD: 10.85 K/UL — HIGH (ref 3.8–10.5)
WBC # FLD AUTO: 10.85 K/UL — HIGH (ref 3.8–10.5)

## 2017-01-13 PROCEDURE — 99232 SBSQ HOSP IP/OBS MODERATE 35: CPT

## 2017-01-13 RX ORDER — BUPROPION HYDROCHLORIDE 150 MG/1
150 TABLET, EXTENDED RELEASE ORAL DAILY
Qty: 0 | Refills: 0 | Status: DISCONTINUED | OUTPATIENT
Start: 2017-01-13 | End: 2017-01-17

## 2017-01-13 RX ORDER — LITHIUM CARBONATE 300 MG/1
600 TABLET, EXTENDED RELEASE ORAL AT BEDTIME
Qty: 0 | Refills: 0 | Status: DISCONTINUED | OUTPATIENT
Start: 2017-01-13 | End: 2017-01-13

## 2017-01-13 RX ORDER — LITHIUM CARBONATE 300 MG/1
1200 TABLET, EXTENDED RELEASE ORAL AT BEDTIME
Qty: 0 | Refills: 0 | Status: DISCONTINUED | OUTPATIENT
Start: 2017-01-15 | End: 2017-01-18

## 2017-01-13 RX ORDER — LITHIUM CARBONATE 300 MG/1
900 TABLET, EXTENDED RELEASE ORAL AT BEDTIME
Qty: 0 | Refills: 0 | Status: COMPLETED | OUTPATIENT
Start: 2017-01-14 | End: 2017-01-14

## 2017-01-13 RX ORDER — LITHIUM CARBONATE 300 MG/1
600 TABLET, EXTENDED RELEASE ORAL AT BEDTIME
Qty: 0 | Refills: 0 | Status: COMPLETED | OUTPATIENT
Start: 2017-01-13 | End: 2017-01-13

## 2017-01-13 RX ADMIN — BUPROPION HYDROCHLORIDE 100 MILLIGRAM(S): 150 TABLET, EXTENDED RELEASE ORAL at 08:14

## 2017-01-13 RX ADMIN — LITHIUM CARBONATE 600 MILLIGRAM(S): 300 TABLET, EXTENDED RELEASE ORAL at 20:33

## 2017-01-13 RX ADMIN — LITHIUM CARBONATE 450 MILLIGRAM(S): 300 TABLET, EXTENDED RELEASE ORAL at 08:14

## 2017-01-13 RX ADMIN — Medication 1 PATCH: at 08:15

## 2017-01-13 RX ADMIN — BUPROPION HYDROCHLORIDE 100 MILLIGRAM(S): 150 TABLET, EXTENDED RELEASE ORAL at 16:17

## 2017-01-13 RX ADMIN — Medication 1 PATCH: at 08:14

## 2017-01-14 RX ORDER — TRAZODONE HCL 50 MG
50 TABLET ORAL ONCE
Qty: 0 | Refills: 0 | Status: COMPLETED | OUTPATIENT
Start: 2017-01-14 | End: 2017-01-14

## 2017-01-14 RX ADMIN — BUPROPION HYDROCHLORIDE 150 MILLIGRAM(S): 150 TABLET, EXTENDED RELEASE ORAL at 08:15

## 2017-01-14 RX ADMIN — Medication 50 MILLIGRAM(S): at 22:42

## 2017-01-14 RX ADMIN — LITHIUM CARBONATE 900 MILLIGRAM(S): 300 TABLET, EXTENDED RELEASE ORAL at 20:45

## 2017-01-14 RX ADMIN — Medication 50 MILLIGRAM(S): at 15:33

## 2017-01-14 RX ADMIN — Medication 50 MILLIGRAM(S): at 08:16

## 2017-01-14 RX ADMIN — Medication 50 MILLIGRAM(S): at 02:37

## 2017-01-14 RX ADMIN — Medication 1 PATCH: at 08:16

## 2017-01-14 RX ADMIN — Medication 1 PATCH: at 08:00

## 2017-01-14 RX ADMIN — Medication 50 MILLIGRAM(S): at 20:45

## 2017-01-15 RX ORDER — HYDROXYZINE HCL 10 MG
25 TABLET ORAL ONCE
Qty: 0 | Refills: 0 | Status: COMPLETED | OUTPATIENT
Start: 2017-01-15 | End: 2017-01-15

## 2017-01-15 RX ADMIN — Medication 50 MILLIGRAM(S): at 16:20

## 2017-01-15 RX ADMIN — Medication 1 PATCH: at 08:13

## 2017-01-15 RX ADMIN — Medication 25 MILLIGRAM(S): at 14:37

## 2017-01-15 RX ADMIN — LITHIUM CARBONATE 1200 MILLIGRAM(S): 300 TABLET, EXTENDED RELEASE ORAL at 21:23

## 2017-01-15 RX ADMIN — Medication 2 MILLIGRAM(S): at 03:44

## 2017-01-15 RX ADMIN — BUPROPION HYDROCHLORIDE 150 MILLIGRAM(S): 150 TABLET, EXTENDED RELEASE ORAL at 08:13

## 2017-01-15 RX ADMIN — Medication 50 MILLIGRAM(S): at 11:32

## 2017-01-15 RX ADMIN — Medication 50 MILLIGRAM(S): at 00:01

## 2017-01-15 RX ADMIN — Medication 1 PATCH: at 08:10

## 2017-01-15 RX ADMIN — Medication 50 MILLIGRAM(S): at 21:23

## 2017-01-16 RX ADMIN — BUPROPION HYDROCHLORIDE 150 MILLIGRAM(S): 150 TABLET, EXTENDED RELEASE ORAL at 10:36

## 2017-01-16 RX ADMIN — LITHIUM CARBONATE 1200 MILLIGRAM(S): 300 TABLET, EXTENDED RELEASE ORAL at 21:12

## 2017-01-17 PROCEDURE — 99232 SBSQ HOSP IP/OBS MODERATE 35: CPT

## 2017-01-17 RX ORDER — HYDROXYZINE HCL 10 MG
50 TABLET ORAL EVERY 6 HOURS
Qty: 0 | Refills: 0 | Status: DISCONTINUED | OUTPATIENT
Start: 2017-01-17 | End: 2017-01-18

## 2017-01-17 RX ORDER — HYDROXYZINE HCL 10 MG
25 TABLET ORAL ONCE
Qty: 0 | Refills: 0 | Status: COMPLETED | OUTPATIENT
Start: 2017-01-17 | End: 2017-01-17

## 2017-01-17 RX ORDER — HYDROXYZINE HCL 10 MG
25 TABLET ORAL EVERY 6 HOURS
Qty: 0 | Refills: 0 | Status: DISCONTINUED | OUTPATIENT
Start: 2017-01-17 | End: 2017-01-17

## 2017-01-17 RX ORDER — BUPROPION HYDROCHLORIDE 150 MG/1
100 TABLET, EXTENDED RELEASE ORAL DAILY
Qty: 0 | Refills: 0 | Status: DISCONTINUED | OUTPATIENT
Start: 2017-01-17 | End: 2017-01-18

## 2017-01-17 RX ADMIN — Medication 50 MILLIGRAM(S): at 13:30

## 2017-01-17 RX ADMIN — Medication 50 MILLIGRAM(S): at 20:05

## 2017-01-17 RX ADMIN — Medication 25 MILLIGRAM(S): at 13:59

## 2017-01-17 RX ADMIN — LITHIUM CARBONATE 1200 MILLIGRAM(S): 300 TABLET, EXTENDED RELEASE ORAL at 20:05

## 2017-01-17 RX ADMIN — Medication 25 MILLIGRAM(S): at 15:43

## 2017-01-17 RX ADMIN — Medication 1 PATCH: at 09:17

## 2017-01-17 RX ADMIN — BUPROPION HYDROCHLORIDE 150 MILLIGRAM(S): 150 TABLET, EXTENDED RELEASE ORAL at 09:17

## 2017-01-18 VITALS — DIASTOLIC BLOOD PRESSURE: 68 MMHG | TEMPERATURE: 98 F | HEART RATE: 78 BPM | SYSTOLIC BLOOD PRESSURE: 118 MMHG

## 2017-01-18 PROCEDURE — 99238 HOSP IP/OBS DSCHRG MGMT 30/<: CPT

## 2017-01-18 RX ORDER — LITHIUM CARBONATE 300 MG/1
4 TABLET, EXTENDED RELEASE ORAL
Qty: 120 | Refills: 0 | OUTPATIENT
Start: 2017-01-18 | End: 2017-02-17

## 2017-01-18 RX ORDER — HYDROXYZINE HCL 10 MG
1 TABLET ORAL
Qty: 60 | Refills: 0 | OUTPATIENT
Start: 2017-01-18 | End: 2017-02-17

## 2017-01-18 RX ORDER — DEXTROAMPHETAMINE SACCHARATE, AMPHETAMINE ASPARTATE, DEXTROAMPHETAMINE SULFATE AND AMPHETAMINE SULFATE 1.875; 1.875; 1.875; 1.875 MG/1; MG/1; MG/1; MG/1
0 TABLET ORAL
Qty: 0 | Refills: 0 | COMMUNITY

## 2017-01-18 RX ORDER — TRAZODONE HCL 50 MG
1 TABLET ORAL
Qty: 30 | Refills: 0 | OUTPATIENT
Start: 2017-01-18 | End: 2017-02-17

## 2017-01-18 RX ORDER — BUPROPION HYDROCHLORIDE 150 MG/1
0 TABLET, EXTENDED RELEASE ORAL
Qty: 0 | Refills: 0 | COMMUNITY

## 2017-01-18 RX ORDER — BUPROPION HYDROCHLORIDE 150 MG/1
1 TABLET, EXTENDED RELEASE ORAL
Qty: 30 | Refills: 0 | OUTPATIENT
Start: 2017-01-18 | End: 2017-02-17

## 2017-01-18 RX ORDER — FLUOXETINE HCL 10 MG
0 CAPSULE ORAL
Qty: 0 | Refills: 0 | COMMUNITY

## 2017-01-18 RX ORDER — NICOTINE POLACRILEX 2 MG
1 GUM BUCCAL
Qty: 1 | Refills: 0 | OUTPATIENT
Start: 2017-01-18 | End: 2017-02-01

## 2017-01-18 RX ADMIN — Medication 1 PATCH: at 08:05

## 2017-01-18 RX ADMIN — Medication 50 MILLIGRAM(S): at 14:20

## 2017-01-18 RX ADMIN — BUPROPION HYDROCHLORIDE 100 MILLIGRAM(S): 150 TABLET, EXTENDED RELEASE ORAL at 08:05

## 2017-01-18 RX ADMIN — Medication 50 MILLIGRAM(S): at 08:05

## 2017-10-17 ENCOUNTER — APPOINTMENT (OUTPATIENT)
Dept: FAMILY MEDICINE | Facility: CLINIC | Age: 47
End: 2017-10-17
Payer: MEDICAID

## 2017-10-17 VITALS
RESPIRATION RATE: 14 BRPM | HEIGHT: 68.11 IN | OXYGEN SATURATION: 97 % | WEIGHT: 178 LBS | SYSTOLIC BLOOD PRESSURE: 122 MMHG | BODY MASS INDEX: 26.98 KG/M2 | HEART RATE: 99 BPM | TEMPERATURE: 98.3 F | DIASTOLIC BLOOD PRESSURE: 80 MMHG

## 2017-10-17 DIAGNOSIS — Z87.891 PERSONAL HISTORY OF NICOTINE DEPENDENCE: ICD-10-CM

## 2017-10-17 DIAGNOSIS — Z60.2 PROBLEMS RELATED TO LIVING ALONE: ICD-10-CM

## 2017-10-17 DIAGNOSIS — Z00.00 ENCOUNTER FOR GENERAL ADULT MEDICAL EXAMINATION W/OUT ABNORMAL FINDINGS: ICD-10-CM

## 2017-10-17 DIAGNOSIS — F41.8 OTHER SPECIFIED ANXIETY DISORDERS: ICD-10-CM

## 2017-10-17 PROCEDURE — 99202 OFFICE O/P NEW SF 15 MIN: CPT

## 2017-10-17 RX ORDER — DEXTROAMPHETAMINE SACCHARATE, AMPHETAMINE ASPARTATE, DEXTROAMPHETAMINE SULFATE, AND AMPHETAMINE SULFATE 2.5; 2.5; 2.5; 2.5 MG/1; MG/1; MG/1; MG/1
10 TABLET ORAL
Refills: 0 | Status: DISCONTINUED | COMMUNITY
End: 2017-10-17

## 2017-10-17 SDOH — SOCIAL STABILITY - SOCIAL INSECURITY: PROBLEMS RELATED TO LIVING ALONE: Z60.2

## 2017-10-19 RX ORDER — DEXTROAMPHETAMINE SULFATE 10 MG/1
10 TABLET ORAL
Refills: 0 | Status: DISCONTINUED | COMMUNITY
End: 2017-10-19

## 2017-10-27 ENCOUNTER — APPOINTMENT (OUTPATIENT)
Dept: FAMILY MEDICINE | Facility: CLINIC | Age: 47
End: 2017-10-27

## 2017-10-30 ENCOUNTER — APPOINTMENT (OUTPATIENT)
Dept: FAMILY MEDICINE | Facility: CLINIC | Age: 47
End: 2017-10-30

## 2017-11-03 ENCOUNTER — APPOINTMENT (OUTPATIENT)
Dept: FAMILY MEDICINE | Facility: CLINIC | Age: 47
End: 2017-11-03

## 2017-11-03 ENCOUNTER — APPOINTMENT (OUTPATIENT)
Dept: FAMILY MEDICINE | Facility: CLINIC | Age: 47
End: 2017-11-03
Payer: MEDICAID

## 2017-11-03 VITALS — SYSTOLIC BLOOD PRESSURE: 120 MMHG | TEMPERATURE: 98 F | DIASTOLIC BLOOD PRESSURE: 70 MMHG

## 2017-11-03 DIAGNOSIS — F98.8 OTHER SPECIFIED BEHAVIORAL AND EMOTIONAL DISORDERS WITH ONSET USUALLY OCCURRING IN CHILDHOOD AND ADOLESCENCE: ICD-10-CM

## 2017-11-03 DIAGNOSIS — R45.1 RESTLESSNESS AND AGITATION: ICD-10-CM

## 2017-11-03 PROCEDURE — 99215 OFFICE O/P EST HI 40 MIN: CPT

## 2017-11-03 RX ORDER — AZITHROMYCIN 250 MG/1
250 TABLET, FILM COATED ORAL
Qty: 6 | Refills: 0 | Status: DISCONTINUED | COMMUNITY
Start: 2017-09-19

## 2017-11-03 RX ORDER — HYDROCORTISONE 10 MG/G
1 OINTMENT TOPICAL
Qty: 28 | Refills: 0 | Status: DISCONTINUED | COMMUNITY
Start: 2017-08-26

## 2017-11-07 ENCOUNTER — OTHER (OUTPATIENT)
Age: 47
End: 2017-11-07

## 2017-11-08 ENCOUNTER — OTHER (OUTPATIENT)
Age: 47
End: 2017-11-08

## 2017-11-14 ENCOUNTER — OTHER (OUTPATIENT)
Age: 47
End: 2017-11-14

## 2017-11-15 ENCOUNTER — OTHER (OUTPATIENT)
Age: 47
End: 2017-11-15

## 2017-12-06 ENCOUNTER — FORM ENCOUNTER (OUTPATIENT)
Age: 47
End: 2017-12-06

## 2017-12-08 ENCOUNTER — APPOINTMENT (OUTPATIENT)
Dept: FAMILY MEDICINE | Facility: CLINIC | Age: 47
End: 2017-12-08

## 2017-12-17 ENCOUNTER — FORM ENCOUNTER (OUTPATIENT)
Age: 47
End: 2017-12-17

## 2017-12-28 ENCOUNTER — APPOINTMENT (OUTPATIENT)
Dept: PSYCHIATRY | Facility: CLINIC | Age: 47
End: 2017-12-28
Payer: MEDICAID

## 2017-12-28 DIAGNOSIS — F39 UNSPECIFIED MOOD [AFFECTIVE] DISORDER: ICD-10-CM

## 2017-12-28 DIAGNOSIS — F14.20 COCAINE DEPENDENCE, UNCOMPLICATED: ICD-10-CM

## 2017-12-28 DIAGNOSIS — F41.9 ANXIETY DISORDER, UNSPECIFIED: ICD-10-CM

## 2017-12-28 PROCEDURE — 99205 OFFICE O/P NEW HI 60 MIN: CPT

## 2017-12-28 RX ORDER — BUPROPION HYDROCHLORIDE 100 MG/1
100 TABLET, FILM COATED ORAL
Refills: 0 | Status: DISCONTINUED | COMMUNITY
End: 2017-12-28

## 2017-12-28 RX ORDER — DEXTROAMPHETAMINE SACCHARATE, AMPHETAMINE ASPARTATE, DEXTROAMPHETAMINE SULFATE AND AMPHETAMINE SULFATE 7.5; 7.5; 7.5; 7.5 MG/1; MG/1; MG/1; MG/1
30 TABLET ORAL
Qty: 30 | Refills: 0 | Status: COMPLETED | COMMUNITY
Start: 2017-10-17 | End: 2017-12-28

## 2017-12-28 RX ORDER — VENLAFAXINE HYDROCHLORIDE 225 MG/1
225 TABLET, EXTENDED RELEASE ORAL
Qty: 30 | Refills: 0 | Status: ACTIVE | COMMUNITY
Start: 2017-12-28 | End: 1900-01-01

## 2017-12-28 RX ORDER — DEXTROAMPHETAMINE SACCHARATE, AMPHETAMINE ASPARTATE, DEXTROAMPHETAMINE SULFATE AND AMPHETAMINE SULFATE 2.5; 2.5; 2.5; 2.5 MG/1; MG/1; MG/1; MG/1
10 TABLET ORAL
Qty: 60 | Refills: 0 | Status: COMPLETED | COMMUNITY
Start: 2017-10-17 | End: 2017-12-28

## 2017-12-28 RX ORDER — DEXTROAMPHETAMINE SACCHARATE, AMPHETAMINE ASPARTATE MONOHYDRATE, DEXTROAMPHETAMINE SULFATE AND AMPHETAMINE SULFATE 7.5; 7.5; 7.5; 7.5 MG/1; MG/1; MG/1; MG/1
30 CAPSULE, EXTENDED RELEASE ORAL
Qty: 7 | Refills: 0 | Status: ACTIVE | COMMUNITY
Start: 2017-12-28 | End: 1900-01-01

## 2017-12-28 RX ORDER — VENLAFAXINE HCL 75 MG
75 TABLET ORAL
Refills: 0 | Status: COMPLETED | COMMUNITY
End: 2017-12-28

## 2017-12-28 RX ORDER — DEXTROAMPHETAMINE SACCHARATE, AMPHETAMINE ASPARTATE MONOHYDRATE, DEXTROAMPHETAMINE SULFATE AND AMPHETAMINE SULFATE 7.5; 7.5; 7.5; 7.5 MG/1; MG/1; MG/1; MG/1
30 CAPSULE, EXTENDED RELEASE ORAL
Qty: 14 | Refills: 0 | Status: COMPLETED | COMMUNITY
Start: 2017-12-21

## 2017-12-28 RX ORDER — VENLAFAXINE HYDROCHLORIDE 150 MG/1
150 CAPSULE, EXTENDED RELEASE ORAL
Refills: 0 | Status: DISCONTINUED | COMMUNITY
End: 2017-12-28

## 2017-12-28 RX ORDER — DEXTROAMPHETAMINE SULFATE 10 MG/1
10 TABLET ORAL DAILY
Qty: 30 | Refills: 0 | Status: COMPLETED | COMMUNITY
Start: 2017-10-19 | End: 2017-12-28

## 2017-12-28 RX ORDER — DEXTROAMPHETAMINE SACCHARATE, AMPHETAMINE ASPARTATE, DEXTROAMPHETAMINE SULFATE AND AMPHETAMINE SULFATE 7.5; 7.5; 7.5; 7.5 MG/1; MG/1; MG/1; MG/1
30 TABLET ORAL DAILY
Qty: 7 | Refills: 0 | Status: COMPLETED | COMMUNITY
Start: 2017-11-03 | End: 2017-12-28

## 2017-12-28 RX ORDER — DEXTROAMPHETAMINE SACCHARATE, AMPHETAMINE ASPARTATE, DEXTROAMPHETAMINE SULFATE AND AMPHETAMINE SULFATE 5; 5; 5; 5 MG/1; MG/1; MG/1; MG/1
20 TABLET ORAL
Qty: 30 | Refills: 0 | Status: COMPLETED | COMMUNITY
Start: 2017-10-17 | End: 2017-12-28

## 2017-12-31 PROBLEM — F41.9 ANXIETY: Status: ACTIVE | Noted: 2017-12-28

## 2017-12-31 PROBLEM — F14.20 COCAINE DEPENDENCE: Status: ACTIVE | Noted: 2017-12-28

## 2018-01-01 ENCOUNTER — OUTPATIENT (OUTPATIENT)
Dept: OUTPATIENT SERVICES | Facility: HOSPITAL | Age: 48
LOS: 1 days | End: 2018-01-01
Payer: MEDICAID

## 2018-01-01 PROCEDURE — G9001: CPT

## 2018-01-03 ENCOUNTER — APPOINTMENT (OUTPATIENT)
Dept: PSYCHIATRY | Facility: CLINIC | Age: 48
End: 2018-01-03

## 2018-01-05 DIAGNOSIS — R69 ILLNESS, UNSPECIFIED: ICD-10-CM

## 2018-01-28 ENCOUNTER — EMERGENCY (EMERGENCY)
Facility: HOSPITAL | Age: 48
LOS: 1 days | Discharge: HOME | End: 2018-01-28
Attending: EMERGENCY MEDICINE | Admitting: EMERGENCY MEDICINE
Payer: MEDICAID

## 2018-01-28 VITALS
DIASTOLIC BLOOD PRESSURE: 90 MMHG | TEMPERATURE: 98 F | OXYGEN SATURATION: 99 % | RESPIRATION RATE: 18 BRPM | SYSTOLIC BLOOD PRESSURE: 134 MMHG | HEART RATE: 92 BPM

## 2018-01-28 PROCEDURE — 99284 EMERGENCY DEPT VISIT MOD MDM: CPT

## 2018-01-28 NOTE — ED PROVIDER NOTE - MEDICAL DECISION MAKING DETAILS
47M with PMH of psychosis, here with an angry outburst.  Now calm, AAOx3, denies SI/HI, no sign of psychosis or physical illness.  Safe to discharge home.

## 2018-01-28 NOTE — ED ADULT TRIAGE NOTE - CHIEF COMPLAINT QUOTE
Patient brought to ER by EMS from home for psychiatrist evaluation. Was on Effexor and Adderall but have not been taking it because he feels ok.

## 2018-01-28 NOTE — ED PROVIDER NOTE - OBJECTIVE STATEMENT
47M with PMH of drug abuse (cocaine and marijuana) and prior admission 1/1/18 for psychosis who was brought in by [facility] for a verbal altercation in a [employer].  The patient felt that his coffee was cold and asked for a new pot to be made.  They refused, and he yelled "Fuck you" and refused to leave [email or web address].  There was no trauma.  The patient is now calm, AAOx3, denies SI/HI, denies hallucinations. 47M with PMH of drug abuse (cocaine and marijuana) and prior admission 1/1/17 for psychosis who was brought in by [facility] for a verbal altercation in a [employer].  The patient felt that his coffee was cold and asked for a new pot to be made.  They refused, and he yelled "Fuck you" and refused to leave [employer].  There was no trauma.  The patient is now calm, AAOx3, denies SI/HI, denies hallucinations.  Also denies HA, neck pain, N/V/D/urinary sx.

## 2020-04-15 NOTE — ED BEHAVIORAL HEALTH ASSESSMENT NOTE - RISK ASSESSMENT
You have chosen to receive care through a telephone visit. Do you consent to use a telephone visit for your medical care today? Yes     Patient is at increased risk for suicidality and violence for having hx of highly impulsive behavior, being an active substance user, having family hx of completed suicide, not being involved in work, not having adequate social supports. The Protective factors include being future oriented, not having access to guns and living with family. The Patient currently denies suicidal or homicidal ideation, intent or plan but considering the severe anxiety, highly impulsive behavior and thought disorder the Patient is not able to engage in safety planning and needs inpatient level of care.

## 2022-02-05 NOTE — ED BEHAVIORAL HEALTH ASSESSMENT NOTE - NS ED BHA MED ROS GASTROINTESTINAL
61M p/w left eyelid laceration 20 hours after slipping on ice. No AC. No vision changes. No eye pain. 61M p/w left upper eyelid laceration 20 hours after slipping on ice. No AC. No vision changes. No eye pain. No headache. Presented to ED today because still oozing. No complaints

## 2023-03-27 NOTE — ED PROVIDER NOTE - SIGNIFICANT NEGATIVE FINDINGS
denies fever, cough, nausea, vomiting
89 year old male w hx of prostate cancer with mets to the bone s/p radiation, colon cancer, anemia requiring transfusion who presents with AMS. Found to be anemic (5.7/19.4) with +occult, BNP elevated 7955 (from 4726 on 3/27), and resp alkalosis 7.5/26/130/22. Urine dark but UA not c/w infection. CT head w chronic changes. CT chest with trace pleural effusions, Left lower lobe nodular atelectasis, and numerous skeletal mets. Pt with difficult IV access requiring multiple trials at central access before left femoral CVC established by Dr. Barajas. Pt  then covered empirically with vanc/zosyn, given ofirmev, and 1L IVF (additional IVF held 2/2 concern for fluid overload as patient on lasix and with edema). With anemia, GI consulted though anemia felt to be most c/w anemia in neoplastic dx/bone marrow suppression. Per family request, consult to palliative and SW also placed. *see downtime (paper) documentation*